# Patient Record
Sex: FEMALE | Race: WHITE | HISPANIC OR LATINO | Employment: FULL TIME | URBAN - METROPOLITAN AREA
[De-identification: names, ages, dates, MRNs, and addresses within clinical notes are randomized per-mention and may not be internally consistent; named-entity substitution may affect disease eponyms.]

---

## 2019-08-28 ENCOUNTER — TRANSFERRED RECORDS (OUTPATIENT)
Dept: HEALTH INFORMATION MANAGEMENT | Facility: CLINIC | Age: 54
End: 2019-08-28
Payer: COMMERCIAL

## 2022-02-21 ENCOUNTER — TRANSCRIBE ORDERS (OUTPATIENT)
Dept: OTHER | Age: 57
End: 2022-02-21
Payer: COMMERCIAL

## 2022-02-21 DIAGNOSIS — G96.01 CSF LEAK FROM NOSE: Primary | ICD-10-CM

## 2022-02-22 ENCOUNTER — PATIENT OUTREACH (OUTPATIENT)
Dept: OTOLARYNGOLOGY | Facility: CLINIC | Age: 57
End: 2022-02-22
Payer: COMMERCIAL

## 2022-02-22 NOTE — PROGRESS NOTES
Writer called and LM regarding scheduling an appointment with Dr. Khoury. Left direct call back number.     Placed hold on schedule for 2/23 at 1000 for patient. She is scheduled for 1500 on 3/2/22.     Lola Llanos RN on 2/22/2022 at 2:33 PM

## 2022-02-22 NOTE — PROGRESS NOTES
Was able to speak with patient. Rearranged her appointment time for 3/2/22 at 1230.     Patient verbalized understanding.     Lola Llanos RN on 2/22/2022 at 2:50 PM

## 2022-02-23 NOTE — TELEPHONE ENCOUNTER
RECORDS RECEIVED FROM: External   REASON FOR VISIT: CSF leak from nose   Date of Appt: 3/2/22   NOTES (FOR ALL VISITS) STATUS DETAILS   OFFICE NOTE from referring provider Care Everywhere Dr Ivana Garrison @ [facility] Neurosurgery:  2/16/22   OFFICE NOTE from other specialist Care Everywhere Dr Sravan Bardales @ [facility] ENT:  9/27/19    Adrianna Douglas NP @ [facility] Neurosurgery:  9/25/19   DISCHARGE REPORT from the ER Care Everywhere [facility]:  9/18/19   OPERATIVE REPORT Care Everywhere [facility]:  10/1/19  placement of lumbar drain   MEDICATION LIST Care Everywhere    IMAGING  (FOR ALL VISITS)     MRI (HEAD, NECK, SPINE) Received [facility]:  MRI Brain 9/18/19    [place], [place]:  MRI Brain 9/27/16   CT (HEAD, NECK, SPINE) Received [facility]:  CT Head 12/3/20  CT Maxillofacial 9/27/19  CT Head 9/25/19    [facility]:  CT Head 8/28/19      Action 2/23/22 Mv 11.17am   Action Taken Images requested by Ana - waiting for images    --2/25/22 6.48am--  Images received and resolved in PACS

## 2022-02-23 NOTE — TELEPHONE ENCOUNTER
FUTURE VISIT INFORMATION      FUTURE VISIT INFORMATION:    Date: 3/2/22    Time: 12:30PM    [place] [facility] [place]FORMATION:    Referring provider:      Referring providers clinic:      Reason for visit/diagnosis      RECORDS REQUESTED FROM:       Clinic name Comments Records Status Imaging Status   [facility] NEUROSURGERY   22 note from Ivana Garrison PA-C  19 note from Adrianna Douglas APRN, CNP   Care everywhere     [facility] EAR, NOSE AND THROAT   19 note from Sravan Bardales MD   Care everywhere     [facility] SURGICAL SERVICES   10/1/19 placement of lumbar drain Care everywhere     [facility] imaging  12/3/2020 CT HEAD   19 CT Maxillofacial   19 CT Head    Care everywhere  req 22 - PACS   [facility]  19 MR brain    Care everywhere  req 22 - PACS   [facility] RADIOLOGY MRI    [facility] [place] [address] [phone]    Fax: [phone] 16 MR brain     *trackin Care everywhere  req 22 - received 22 7:54AM Sent a fax to [facility] for images - Amay   *received a fax from [facility], some images were done at [facility] and [facility], sent a fax for images - Amay   *images from [facility] and [facility] received in PACS, waiting for [facility] to be mailed out - Amay   22 4PM received images from [facility] on a disc, sent to upload - Amay

## 2022-02-25 NOTE — PROGRESS NOTES
[facility]  Department of Neurosurgery  [facility]    March 9, 2022    Reason for visit: CSF rhinorrhea, new patient visit    Dear Ms. Garrison and Dr. Vera,    It was a pleasure to see Hannah Rodriguez in the [facility] today as a new patient.  As you recall, Ms. Rodriguez is a 56-year-old right-handed female who was diagnosed initially with CSF rhinorrhea back in 2019. At that time, she presented with clear fluid drainage from the nares, headaches, and dizziness. Under Dr. Vera's direction, she underwent lumbar drain placement which eventually resolved CSF rhinorrhea. A beta-2 transferrin was positive at that time. She did she did not require endonasal surgery at that time. She subsequently has done well however most recently she has experienced similar symptoms to her first episode, including nasal drainage, headaches and return of her dizziness.  She contacted the neurosurgery team at [facility] and she was referred here for further management. Her last eye exam was 10 months ago (05/11/2021) by Dr. Boo Casillas of [facility]. Three months ago, she developed left leg cellulitis otherwise she has been doing well.     Today, the patient reports that she was hospitalized for 18 days during her previous CSF rhinorrhea episode in 2020. During this hospitalization, she had 2 lumbar drain placements. Hannah reports that her dizziness and headaches this time around are much worse than her first episode of CSF rhinorrhea. She reports that she has has falls with head trauma before and after her first CSF rhinorrhea diagnosis. She endorses history of chronic ear disease. Hannah explains that the amount of CSF rhinorrhea waxes and wanes, and specifically increases with activity and with blood pressure increases.  She is interested in gastric sleeve surgery planned for later this year.    She reports that her mother had history of a brain  tumor, and her children feel that her symptoms are similar to their grandmother's. They are worried that their mother is presenting similar symptoms. Hannah notes that her mother has history of tobacco use.    Hannah reports that she has received both of her pneumococcal vaccinations for potential CSF leak, as per direction of our staff prior to our appointment.     Past medical history: atrial fibrillation, ADHD, hypertension, headaches, left knee pain, obesity, YOHANA, prediabetes, hysterectomy, colonoscopy    Medications: Metoprolol, Metformin, vitamin D, diltiazem, lisinopril, gabapentin, Xarelto, Cymbalta diphenhydramine, bisacodyl, Lasix, B12, potassium, acetaminophen    Allergies: Na Benzoate, Sulfamethoxazole, Trimethoprim, Adhesive Tape    Surgical history: 4 c-sections, hysterectomy, tonsillectomy, and cholecystectomy     Family history: Noncontributory    Social history: She is  and is a lifetime non-smoker. She has 4 children. Works for [employer] as a .    Physical exam: She is fluent speech and very pleasant. There is no nasal drainage when leaning forward. Palate symmetric, tongue midline. Shoulders symmetric. HB I/VI bilaterally. She has clear fluid that drains from the nose as she leans forward over 5 minutes.  Visual fields are full to confrontation. Visual acuity is 20/20 uncorrected bilaterally.  Extraocular movements are intact.  Her face is symmetric with activation.  Her tongue is midline.  She has no pronator drift.  She has full strength in all extremities.    Imaging: We reviewed the results of her MRI from 2016, her CT and MRI scans from 2019, and her CT scan from 2020.  On the CT scan, there may be a small left cribriform plate defect, but this is not well seen on MRI scan. There appears to be intact bone along the planum, tuberculum, and sphenoid sinus.  While the sphenoid is well aerated, I do not see communication laterally along the lateral sphenoid sinus  oliva.    Assessment:  1.  History of CSF rhinorrhea in 2019 resolved with lumbar drainage (confirmed with a positive beta-2 transferrin at that time)  2.  Recurrent nasal drainage    Plan:  1.  We reviewed results of her imaging however we are concerned that she has new nasal drainage.  Given her history, she will require work-up to determine if this is consistent with recurrent CSF rhinorrhea.  2.  We will send a new beta-2 transferrin test from nasal drainage collection. She will work to collect this.  3.  I would like to repeat her MRI including T2 coronal thin sections (noncontrast MRI) and CT of the sinuses  4.  Referral to neuro-ophthalmology to rule out papilledema  5.  She will visit with her ENT skull base colleague Dr. Wander Khoury later this morning and he will perform an endoscopy  6.  She will receive the 2 pneumococcal vaccinations according to CDC guidelines for spontaneous CSF leak.  She will coordinate with her primary care physician to receive these.   7.  Dr. Khoury and/or myself will plan to see her back upon completion of her imaging and new beta-2 transferrin results.  5.  We reviewed warning signs for which she will contact us prior to her next appointment with us.  6.  I encouraged her to contact us should any questions or concerns arise in advance of her next appointment      It is been a pleasure to participate in the care of your patient.  Please please contact us if we may be of any assistance for Ms. Rodriguez.    Sincerely,  Trevor Encinas MD    Scribe Disclosure:  I, Sandie Molina, am serving as a scribe to document services personally performed by Trevor Encinas MD at this visit, based upon the provider's statements to me. All documentation has been reviewed by the aforementioned provider prior to being entered into the official medical record.

## 2022-03-02 ENCOUNTER — TELEPHONE (OUTPATIENT)
Dept: NEUROSURGERY | Facility: CLINIC | Age: 57
End: 2022-03-02

## 2022-03-02 ENCOUNTER — TELEPHONE (OUTPATIENT)
Dept: OTOLARYNGOLOGY | Facility: CLINIC | Age: 57
End: 2022-03-02

## 2022-03-02 ENCOUNTER — PRE VISIT (OUTPATIENT)
Dept: NEUROSURGERY | Facility: CLINIC | Age: 57
End: 2022-03-02

## 2022-03-02 ENCOUNTER — PRE VISIT (OUTPATIENT)
Dept: OTOLARYNGOLOGY | Facility: CLINIC | Age: 57
End: 2022-03-02

## 2022-03-02 DIAGNOSIS — G96.00 CSF LEAK: Primary | ICD-10-CM

## 2022-03-02 NOTE — TELEPHONE ENCOUNTER
[facility] Call Center    Phone Message    May a detailed message be left on voicemail: yes     Reason for Call: Other: Pt is scheduled with Dr. Khoury for today, but she needs to r/s as her pipes at home burst. Writer doesn't see a referral or any Appt notes to determine if pt can be scheduled with another provider or if she was referred to Dr. Khoury, and she would like to come in next Wednesday (3/9) as she has another Appt that day as well. Please call pt to discuss r/s options. Thank you.     Action Taken: Message routed to:  [facility] ENT    Travel Screening: Not Applicable

## 2022-03-02 NOTE — TELEPHONE ENCOUNTER
Spoke with patient ahead of her appointment next Wednesday with Dr. Encinas and Dr. Khoury. Dr. Encinas recommending that patient collect nasal drainage to test for beta-2 transferrin prior to appointments, and get pneumococcal vaccines per CSF leak protocol.    Patient reports she tried to collect fluid in clinic at , but was unable to collect enough. She has a specimen cup at home, and a process to bring this to her local lab (they will send out for processing). She will try to collect fluid again over the next week.    Also reviewed recommendations for pneumococcal vaccines with patient. She received both in September and November of 2019. Nothing further needed.    Adela Reis, RN  RN Care Coordinator, Skull Base Surgery  Direct: [phone]

## 2022-03-08 NOTE — PROGRESS NOTES
[facility]  New Patient Visit      Encounter date:   March 9, 2022    Referring Provider:   Ivana Garrison PA-C  No address on file    Chief Complaint: CSF Leak    History of Present Illness:   Hannah Rodriguez is a 56 year old female on Xarelto for atrial fibrillation who presents for consultation regarding CSF leak. The patient was seen by Ivana Garrison on 02/16/2022 for nasal drainage, headache, and dizziness. She was referred here for concern for CSF leak; she was hospitalized for 18 days in regards to CSF leak in 2019 with 2 lumbar drain placements. She did visit with my colleague Dr. Encinas earlier today.      She tells me that after her second drain placement in 2019 she had improvement in her headaches, dizziness, and LEFT-sided rhinorrhea; however these episodes started to resume this last fall. Over the last year she has had mild draining of CSF from the RIGHT nares that transiently worsens with activity and bending over. It is also worse at night and will feel like a post nasal drip. There has been no left sided leakage. In between these episodes she does have baseline rhinorrhea, which she is able to discern from her CSF leak.  She describes CSF rhinorrhea classically as clear watery nasal drainage that has a distinct salty, metallic taste.    Referral has been placed for neuro-ophthalmology.    She will be undergoing workup for gastric sleeve and hopes to get this later this year.     Sino-Nasal Outcome Test (SNOT - 22)  DNT    [place] Oper[facility] History:  No sinonasal surgeries     Review of systems: A 14-point review of systems has been conducted and was negative for any notable symptoms, except as dictated in the history of present illness.     Past medical history: atrial fibrillation, ADHD, hypertension, headaches, left knee pain, obesity, YOHANA, prediabetes, hysterectomy, colonoscopy     Medications: Metoprolol, Metformin, vitamin D, diltiazem, lisinopril, gabapentin, Xarelto,  "Cymbalta diphenhydramine, bisacodyl, Lasix, B12, potassium, acetaminophen     Allergies: Na Benzoate, Sulfamethoxazole, Trimethoprim, Adhesive Tape     Surgical history: 4 c-sections, hysterectomy, tonsillectomy, and cholecystectomy        Social History:   Social History     Socioeconomic History     Marital status:      Spouse name: Not on file     Number of children: Not on file     Years of education: Not on file     Highest education level: Not on file   Occupational History     Not on file   Tobacco Use     Smoking status: Never Smoker     Smokeless tobacco: Never Used   Substance and Sexual Activity     Alcohol use: Not Currently     Drug use: Never     Sexual activity: Not on file   Other Topics Concern     Not on file   Social History Narrative     Not on file     Social Determinants of Health     Financial Resource Strain: Not on file   Food Insecurity: Not on file   Transportation Needs: Not on file   Physical Activity: Not on file   Stress: Not on file   Social Connections: Not on file   Intimate Partner Violence: Not on file   Housing Stability: Not on file        Physical Exam:  Vital signs: BP (!) 155/66 (BP Location: Left arm, Patient Position: Sitting)   Pulse 63   Ht 1.778 m (5' 10\")   Wt 131.5 kg (289 lb 14.4 oz)   SpO2 95%   BMI 41.60 kg/m     General Appearance: No acute distress, appropriate demeanor, conversant  Eyes: moist conjunctivae; EOMI; pupils symmetric; visual acuity grossly intact; no proptosis  Head: normocephalic; overall symmetric appearance without deformity  Face: overall symmetric without deformity; HB I/VI  Ears: Normal appearance of external ear; external meatus normal in appearance; TMs intact without perforation bilaterally;   Nose: No external deformity; inferior turbinates without significant hypertrophy  Oral Cavity/oropharynx: Normal appearance of mucosa; tongue midline; no mass or lesions; tonsils surgically absent; oropharynx without obvious mucosal " abnormality  Neck: no palpable lymphadenopathy; thyroid without palpable nodules  Lungs: symmetric chest rise; no wheezing  CV: Good distal perfusion; normal hear rate  Extremities: No deformity  Neurologic Exam: Cranial nerves II-XII are grossly intact; no focal deficit      Procedure Note  Procedure performed: Rigid nasal endoscopy  Indication: To evaluate for sinonasal pathology not visualized on routine anterior rhinoscopy  Anesthesia: 4% topical lidocaine with 0.05% oxymetazoline  Description of procedure: A 30 degree, 3 mm rigid endoscope was inserted into bilateral nasal cavities and the nasal valves, nasal cavity, middle meatus, sphenoethmoid recess, and nasopharynx were thoroughly evaluated for evidence of obstruction, edema, purulence, polyps and/or mass/lesion.     [place] Endoscopic Scoring System  Endoscopic observation Right Left   Polyps in middle meatus (0 = absent, 1 = restricted to middle meatus, 2 = Beyond middle meatus) 0 0   Discharge (0 = absent, 1 = thin and clear, 2 = thick, purulent) 0 0   Edema (0 = absent, 1 = mild-moderate, 2 = moderate-severe) 0 0   Crusting (0 = absent, 1 = mild-moderate, 2 = moderate-severe) 0 0   Scarring (0= absent, 1 = mild-moderate, 2 = moderate-severe) 0 0   Total 0 0     Findings  RT: no active pooling of clear rhinorrhea; no pooling of secretions in olfactory cleft  LT: no active pooling of clear rhinorrhea; no pooling of secretions in olfactory cleft    The patient tolerated the procedure well without complication.     Laboratory Review:  n/a    Imaging Review:  Reviewed outside MRI B and CT    CT indicates the presence of a left cribriform plate defect    This is not is clear on MRI brain      Pathology Review:  n/a    Assessment/Medical Decision Making:  Spontaneous CSF leak, 2 lumbar drains  Hypertension   Morbid obesity   Atrial fibrillation on Xarelto   Recurrent epistaxis       Plan:  Endoscopy here shows no active CSF pooling or drainage today. We  will have her try to collect some fluid today and have her undergo CT. I agree with Dr. Colorado's plan for her to follow with ophthalmology to rule out papilledema. She should continue follow-up with her weight loss team. I did discuss CSF repair surgery which may be indicated in the future. We will have her follow-up pending results of CT and potentially nasal secretion drainage.    We discussed that surgery may require coverage of bilateral cribriform plates and that this may affect her sense of smell.  We also did discuss the importance of weight loss for long-term management of intracranial hypertension.    Handouts provided regarding CSF leak and repair today.      Yoni Khoury MD    [facility]  Rhinology  Endoscopic Skull Base Surgery  [facility]  Department of Otolaryngology - Head & Neck Surgery    Scribe Disclosure:  I, Bryn Price, am serving as a scribe to document services personally performed by Yoni Khoury MD at this visit, based upon the provider's statements to me. All documentation has been reviewed by the aforementioned provider prior to being entered into the official medical record.     The above documentation was completed with the aid of voice recognition dictation software, and as a result, unexpected dictation errors may occur. Please don't hesitate to contact me for any clarification needed regarding this note.

## 2022-03-09 ENCOUNTER — TELEPHONE (OUTPATIENT)
Dept: OPHTHALMOLOGY | Facility: CLINIC | Age: 57
End: 2022-03-09

## 2022-03-09 ENCOUNTER — ANCILLARY PROCEDURE (OUTPATIENT)
Dept: CT IMAGING | Facility: CLINIC | Age: 57
End: 2022-03-09
Attending: OTOLARYNGOLOGY
Payer: COMMERCIAL

## 2022-03-09 ENCOUNTER — OFFICE VISIT (OUTPATIENT)
Dept: NEUROSURGERY | Facility: CLINIC | Age: 57
End: 2022-03-09
Payer: COMMERCIAL

## 2022-03-09 ENCOUNTER — OFFICE VISIT (OUTPATIENT)
Dept: OTOLARYNGOLOGY | Facility: CLINIC | Age: 57
End: 2022-03-09
Payer: COMMERCIAL

## 2022-03-09 VITALS
OXYGEN SATURATION: 95 % | HEART RATE: 63 BPM | BODY MASS INDEX: 41.5 KG/M2 | HEIGHT: 70 IN | DIASTOLIC BLOOD PRESSURE: 66 MMHG | WEIGHT: 289.9 LBS | SYSTOLIC BLOOD PRESSURE: 155 MMHG

## 2022-03-09 VITALS — SYSTOLIC BLOOD PRESSURE: 166 MMHG | HEART RATE: 82 BPM | OXYGEN SATURATION: 94 % | DIASTOLIC BLOOD PRESSURE: 94 MMHG

## 2022-03-09 DIAGNOSIS — G96.01 CSF RHINORRHEA: Primary | ICD-10-CM

## 2022-03-09 DIAGNOSIS — G93.2 INTRACRANIAL HYPERTENSION: ICD-10-CM

## 2022-03-09 DIAGNOSIS — E66.01 MORBID OBESITY (H): ICD-10-CM

## 2022-03-09 DIAGNOSIS — J32.9 SINUSITIS: ICD-10-CM

## 2022-03-09 DIAGNOSIS — G96.00 CSF LEAK: Primary | ICD-10-CM

## 2022-03-09 PROCEDURE — 70486 CT MAXILLOFACIAL W/O DYE: CPT | Performed by: RADIOLOGY

## 2022-03-09 PROCEDURE — 99204 OFFICE O/P NEW MOD 45 MIN: CPT | Performed by: NEUROLOGICAL SURGERY

## 2022-03-09 PROCEDURE — 31231 NASAL ENDOSCOPY DX: CPT | Performed by: OTOLARYNGOLOGY

## 2022-03-09 PROCEDURE — 99204 OFFICE O/P NEW MOD 45 MIN: CPT | Mod: 25 | Performed by: OTOLARYNGOLOGY

## 2022-03-09 RX ORDER — METFORMIN HCL 500 MG
500 TABLET, EXTENDED RELEASE 24 HR ORAL 2 TIMES DAILY
COMMUNITY
Start: 2022-01-04

## 2022-03-09 RX ORDER — METOPROLOL TARTRATE 50 MG
50 TABLET ORAL 2 TIMES DAILY
COMMUNITY

## 2022-03-09 RX ORDER — LISINOPRIL 5 MG/1
5 TABLET ORAL DAILY
COMMUNITY
Start: 2021-07-01

## 2022-03-09 RX ORDER — DIPHENHYDRAMINE HCL 25 MG
25 CAPSULE ORAL EVERY 6 HOURS PRN
COMMUNITY
Start: 2021-05-06

## 2022-03-09 RX ORDER — FERROUS SULFATE 325(65) MG
325 TABLET ORAL
COMMUNITY
Start: 2022-02-23

## 2022-03-09 RX ORDER — BISACODYL 5 MG/1
10 TABLET, DELAYED RELEASE ORAL DAILY PRN
COMMUNITY
Start: 2021-04-13

## 2022-03-09 RX ORDER — DILTIAZEM HYDROCHLORIDE 180 MG/1
180 CAPSULE, EXTENDED RELEASE ORAL DAILY
COMMUNITY
Start: 2021-10-07

## 2022-03-09 RX ORDER — FUROSEMIDE 40 MG
40 TABLET ORAL DAILY
COMMUNITY
Start: 2022-03-07

## 2022-03-09 RX ORDER — POTASSIUM CHLORIDE 1500 MG/1
20 TABLET, EXTENDED RELEASE ORAL DAILY
COMMUNITY
Start: 2022-03-07

## 2022-03-09 RX ORDER — DULOXETIN HYDROCHLORIDE 20 MG/1
1 CAPSULE, DELAYED RELEASE ORAL DAILY
COMMUNITY
Start: 2021-04-15

## 2022-03-09 RX ORDER — ACETAMINOPHEN 500 MG
1000 TABLET ORAL EVERY 8 HOURS PRN
COMMUNITY
Start: 2020-12-09

## 2022-03-09 RX ORDER — GABAPENTIN 600 MG/1
2 TABLET ORAL SEE ADMIN INSTRUCTIONS
COMMUNITY
Start: 2021-06-09

## 2022-03-09 ASSESSMENT — PATIENT HEALTH QUESTIONNAIRE - PHQ9: SUM OF ALL RESPONSES TO PHQ QUESTIONS 1-9: 16

## 2022-03-09 ASSESSMENT — PAIN SCALES - GENERAL: PAINLEVEL: SEVERE PAIN (6)

## 2022-03-09 NOTE — PATIENT INSTRUCTIONS
"1. You were seen in the clinic today by Dr. Khoury. If you have any questions or concerns after your appointment, please call the clinic at [phone]. Press \"1\" for scheduling, press \"3\" for nurse advice.    2.   The following has been recommended for you based upon your appointment today:   - CT scan of facial bones.   - Collect CSF fluid.    3.   Plan to return the clinic pending CT results.       Sandie Zurita LPN  [facility]  Department of Otolaryngology  [phone]    "

## 2022-03-09 NOTE — TELEPHONE ENCOUNTER
[facility] Call Center    Phone Message    May a detailed message be left on voicemail: no     Reason for Call: Appointment Intake    Referring Provider Name: CHRISTINE YOU  Diagnosis and/or Symptoms: CSF leak      Dr. Escobar or Dr. Edge - Neuro-ophthalmology please    Sending to clinic for review    Action Taken: Other: EYE    Travel Screening: Not Applicable

## 2022-03-09 NOTE — Clinical Note
3/9/2022       RE: Hannah Rodriguez  [address] [facility] [address]     Dear Colleague,    Thank you for referring your patient, Hannah Rodriguez, to the [facility] NEUROSURGERY CLINIC [place] at [facility]. Please see a copy of my visit note below.    [facility]  Department of Neurosurgery  Center for Skull Base and Pituitary Surgery    March 9, 2022    Reason for visit: CSF rhinorrhea, new patient visit    Dear Ms. Garrison and Dr. Vera,    It was a pleasure to see Hannah Rodriguez in the Center for Skull Base and Pituitary Surgery today as a new patient.  As you recall, Ms. Rodriguez is a 56-year-old right-handed female who was diagnosed initially with CSF rhinorrhea back in 2019. At that time, she presented with clear fluid drainage from the nares, headaches, and dizziness. Under Dr. Vera's direction, she underwent lumbar drain placement which eventually resolved CSF rhinorrhea. A beta-2 transferrin was positive at that time. She did she did not require endonasal surgery at that time. She subsequently has done well however most recently she has experienced similar symptoms to her first episode, including nasal drainage, headaches and return of her dizziness.  She contacted the neurosurgery team at [facility] and she was referred here for further management. Her last eye exam was 10 months ago (05/11/2021) by Dr. Boo Casillas of [facility]. Three months ago, she developed left leg cellulitis otherwise she has been doing well.     Today, the patient reports that she was hospitalized for 18 days during her previous CSF rhinorrhea episode in 2020. During this hospitalization, she had 2 lumbar drain placements. Hannah reports that her dizziness and headaches this time around are much worse than her first episode of CSF rhinorrhea. She reports that she has has falls with head trauma before and after her first CSF rhinorrhea diagnosis. She  endorses history of chronic ear disease. Hannah explains that the amount of CSF rhinorrhea waxes and wanes, and specifically increases with activity and with blood pressure increases.  She is interested in gastric sleeve surgery planned for later this year.    She reports that her mother had history of a brain tumor, and her children feel that her symptoms are similar to their grandmother's. They are worried that their mother is presenting similar symptoms. Hannah notes that her mother has history of tobacco use.    Hannah reports that she has received both of her pneumococcal vaccinations for potential CSF leak, as per direction of our staff prior to our appointment.     Past medical history: atrial fibrillation, ADHD, hypertension, headaches, left knee pain, obesity, YOHANA, prediabetes, hysterectomy, colonoscopy    Medications: Metoprolol, Metformin, vitamin D, diltiazem, lisinopril, gabapentin, Xarelto, Cymbalta diphenhydramine, bisacodyl, Lasix, B12, potassium, acetaminophen    Allergies: Na Benzoate, Sulfamethoxazole, Trimethoprim, Adhesive Tape    Surgical history: 4 c-sections, hysterectomy, tonsillectomy, and cholecystectomy     Family history: Noncontributory    Social history: She is  and is a lifetime non-smoker. She has 4 children. Works for [employer] as a .    Physical exam: She is fluent speech and very pleasant. There is no nasal drainage when leaning forward. Palate symmetric, tongue midline. Shoulders symmetric. HB I/VI bilaterally. She has clear fluid that drains from the nose as she leans forward over 5 minutes.  Visual fields are full to confrontation. Visual acuity is 20/20 uncorrected bilaterally.  Extraocular movements are intact.  Her face is symmetric with activation.  Her tongue is midline.  She has no pronator drift.  She has full strength in all extremities.    Imaging: We reviewed the results of her MRI from 2016, her CT and MRI scans from 2019, and her  CT scan from 2020.  On the CT scan, there may be a small left cribriform plate defect, but this is not well seen on MRI scan. There appears to be intact bone along the planum, tuberculum, and sphenoid sinus.  While the sphenoid is well aerated, I do not see communication laterally along the lateral sphenoid sinus wall.    Assessment:  1.  History of CSF rhinorrhea in 2019 resolved with lumbar drainage (confirmed with a positive beta-2 transferrin at that time)  2.  Recurrent nasal drainage    Plan:  1.  We reviewed results of her imaging however we are concerned that she has new nasal drainage.  Given her history, she will require work-up to determine if this is consistent with recurrent CSF rhinorrhea.  2.  We will send a new beta-2 transferrin test from nasal drainage collection. She will work to collect this.  3.  I would like to repeat her MRI including T2 coronal thin sections (noncontrast MRI) and CT of the sinuses  4.  Referral to neuro-ophthalmology to rule out papilledema  5.  She will visit with her ENT skull base colleague Dr. Wander Khoury later this morning and he will perform an endoscopy  6.  She will receive the 2 pneumococcal vaccinations according to CDC guidelines for spontaneous CSF leak.  She will coordinate with her primary care physician to receive these.   7.  Dr. Khoury and/or myself will plan to see her back upon completion of her imaging and new beta-2 transferrin results.  5.  We reviewed warning signs for which she will contact us prior to her next appointment with us.  6.  I encouraged her to contact us should any questions or concerns arise in advance of her next appointment      It is been a pleasure to participate in the care of your patient.  Please please contact us if we may be of any assistance for Ms. Rodriguez.    Sincerely,  Trevor Encinas MD    Scribe Disclosure:  I, Sandie Molina, am serving as a scribe to document services personally performed by Trevor Encinas MD at  this visit, based upon the provider's statements to me. All documentation has been reviewed by the aforementioned provider prior to being entered into the official medical record.         Again, thank you for allowing me to participate in the care of your patient.      Sincerely,    Trevor Encinas MD

## 2022-03-09 NOTE — LETTER
3/9/2022     RE: Hannah Rodriguez  [address] [facility] [address]     Dear Colleague,    Thank you for referring your patient, Hannah Rodriguez, to the [facility] EAR NOSE AND THROAT CLINIC [place] at [facility]. Please see a copy of my visit note below.      [facility]  New Patient Visit      Encounter date:   March 9, 2022    Referring Provider:   Ivana Garrison PA-C  No address on file    Chief Complaint: CSF Leak    History of Present Illness:   Hannah Rodriguez is a 56 year old female on Xarelto for atrial fibrillation who presents for consultation regarding CSF leak. The patient was seen by Ivana Garrison on 02/16/2022 for nasal drainage, headache, and dizziness. She was referred here for concern for CSF leak; she was hospitalized for 18 days in regards to CSF leak in 2019 with 2 lumbar drain placements. She did visit with my colleague Dr. Encinas earlier today.      She tells me that after her second drain placement in 2019 she had improvement in her headaches, dizziness, and LEFT-sided rhinorrhea; however these episodes started to resume this last fall. Over the last year she has had mild draining of CSF from the RIGHT nares that transiently worsens with activity and bending over. It is also worse at night and will feel like a post nasal drip. There has been no left sided leakage. In between these episodes she does have baseline rhinorrhea, which she is able to discern from her CSF leak.  She describes CSF rhinorrhea classically as clear watery nasal drainage that has a distinct salty, metallic taste.    Referral has been placed for neuro-ophthalmology.    She will be undergoing workup for gastric sleeve and hopes to get this later this year.     Sino-Nasal Outcome Test (SNOT - 22)  DNT    [place] [facility] History:  No sinonasal surgeries     Review of systems: A 14-point review of systems has been conducted and was negative for  "any notable symptoms, except as dictated in the history of present illness.     Past medical history: atrial fibrillation, ADHD, hypertension, headaches, left knee pain, obesity, YOHANA, prediabetes, hysterectomy, colonoscopy     Medications: Metoprolol, Metformin, vitamin D, diltiazem, lisinopril, gabapentin, Xarelto, Cymbalta diphenhydramine, bisacodyl, Lasix, B12, potassium, acetaminophen     Allergies: Na Benzoate, Sulfamethoxazole, Trimethoprim, Adhesive Tape     Surgical history: 4 c-sections, hysterectomy, tonsillectomy, and cholecystectomy        Social History:   Social History     Socioeconomic History     Marital status:      Spouse name: Not on file     Number of children: Not on file     Years of education: Not on file     Highest education level: Not on file   Occupational History     Not on file   Tobacco Use     Smoking status: Never Smoker     Smokeless tobacco: Never Used   Substance and Sexual Activity     Alcohol use: Not Currently     Drug use: Never     Sexual activity: Not on file   Other Topics Concern     Not on file   Social History Narrative     Not on file     Social Determinants of Health     Financial Resource Strain: Not on file   Food Insecurity: Not on file   Transportation Needs: Not on file   Physical Activity: Not on file   Stress: Not on file   Social Connections: Not on file   Intimate Partner Violence: Not on file   Housing Stability: Not on file        Physical Exam:  Vital signs: BP (!) 155/66 (BP Location: Left arm, Patient Position: Sitting)   Pulse 63   Ht 1.778 m (5' 10\")   Wt 131.5 kg (289 lb 14.4 oz)   SpO2 95%   BMI 41.60 kg/m     General Appearance: No acute distress, appropriate demeanor, conversant  Eyes: moist conjunctivae; EOMI; pupils symmetric; visual acuity grossly intact; no proptosis  Head: normocephalic; overall symmetric appearance without deformity  Face: overall symmetric without deformity; HB I/VI  Ears: Normal appearance of external ear; " external meatus normal in appearance; TMs intact without perforation bilaterally;   Nose: No external deformity; inferior turbinates without significant hypertrophy  Oral Cavity/oropharynx: Normal appearance of mucosa; tongue midline; no mass or lesions; tonsils surgically absent; oropharynx without obvious mucosal abnormality  Neck: no palpable lymphadenopathy; thyroid without palpable nodules  Lungs: symmetric chest rise; no wheezing  CV: Good distal perfusion; normal hear rate  Extremities: No deformity  Neurologic Exam: Cranial nerves II-XII are grossly intact; no focal deficit      Procedure Note  Procedure performed: Rigid nasal endoscopy  Indication: To evaluate for sinonasal pathology not visualized on routine anterior rhinoscopy  Anesthesia: 4% topical lidocaine with 0.05% oxymetazoline  Description of procedure: A 30 degree, 3 mm rigid endoscope was inserted into bilateral nasal cavities and the nasal valves, nasal cavity, middle meatus, sphenoethmoid recess, and nasopharynx were thoroughly evaluated for evidence of obstruction, edema, purulence, polyps and/or mass/lesion.     Pablo-Jun Endoscopic Scoring System  Endoscopic observation Right Left   Polyps in middle meatus (0 = absent, 1 = restricted to middle meatus, 2 = Beyond middle meatus) 0 0   Discharge (0 = absent, 1 = thin and clear, 2 = thick, purulent) 0 0   Edema (0 = absent, 1 = mild-moderate, 2 = moderate-severe) 0 0   Crusting (0 = absent, 1 = mild-moderate, 2 = moderate-severe) 0 0   Scarring (0= absent, 1 = mild-moderate, 2 = moderate-severe) 0 0   Total 0 0     Findings  RT: no active pooling of clear rhinorrhea; no pooling of secretions in olfactory cleft  LT: no active pooling of clear rhinorrhea; no pooling of secretions in olfactory cleft    The patient tolerated the procedure well without complication.     Laboratory Review:  n/a    Imaging Review:  Reviewed outside MRI B and CT    CT indicates the presence of a left cribriform  plate defect    This is not is clear on MRI brain      Pathology Review:  n/a    Assessment/Medical Decision Making:  Spontaneous CSF leak, 2 lumbar drains  Hypertension   Morbid obesity   Atrial fibrillation on Xarelto   Recurrent epistaxis       Plan:  Endoscopy here shows no active CSF pooling or drainage today. We will have her try to collect some fluid today and have her undergo CT. I agree with Dr. Colorado's plan for her to follow with ophthalmology to rule out papilledema. She should continue follow-up with her weight loss team. I did discuss CSF repair surgery which may be indicated in the future. We will have her follow-up pending results of CT and potentially nasal secretion drainage.    We discussed that surgery may require coverage of bilateral cribriform plates and that this may affect her sense of smell.  We also did discuss the importance of weight loss for long-term management of intracranial hypertension.    Handouts provided regarding CSF leak and repair today.      Yoni Khoury MD    [facility]  Rhinology  Endoscopic Skull Base Surgery  [facility]  Department of Otolaryngology - Head & Neck Surgery    Scribe Disclosure:  I, Bryn Price, am serving as a scribe to document services personally performed by Yoni Khoury MD at this visit, based upon the provider's statements to me. All documentation has been reviewed by the aforementioned provider prior to being entered into the official medical record.     The above documentation was completed with the aid of voice recognition dictation software, and as a result, unexpected dictation errors may occur. Please don't hesitate to contact me for any clarification needed regarding this note.     Again, thank you for allowing me to participate in the care of your patient.      Sincerely,  Yoni Khoury MD

## 2022-03-09 NOTE — TELEPHONE ENCOUNTER
She was seen today by Dr. Encinas @ neuro Clinic - notes are in the chart     Next available ?      Thank You,     Constanza

## 2022-03-09 NOTE — NURSING NOTE
"Chief Complaint   Patient presents with     Consult     CSF leak, headache, facial pain    Blood pressure (!) 155/66, pulse 63, height 1.778 m (5' 10\"), weight 131.5 kg (289 lb 14.4 oz), SpO2 95 %. Sandie Zurita LPN    "

## 2022-03-09 NOTE — LETTER
Date:March 9, 2022      Provider requested that no letter be sent. Do not send.       [facility]

## 2022-03-09 NOTE — LETTER
CENTER FOR SKULL BASE AND PITUITARY SURGERY  [facility] NEUROSURGERY CLINIC [address]  3RD FLOOR  [facility] [address]  Phone: [phone]  Fax: [phone]          3/9/2022    RE:   Hannah Rodriguez  [address] [facility] [address]      Dear Colleague,    Thank you for referring your patient, Hannah Rodriguez, to the Center for Skull Base and Pituitary Surgery. Please see a copy of my visit note below.      [facility]  Department of Neurosurgery  Center for Skull Base and Pituitary Surgery    March 9, 2022    Reason for visit: CSF rhinorrhea, new patient visit    Dear Ms. Garrison and Dr. Vera,    It was a pleasure to see Hannah Rodriguez in the Center for Skull Base and Pituitary Surgery today as a new patient.  As you recall, Ms. Rodriguez is a 56-year-old right-handed female who was diagnosed initially with CSF rhinorrhea back in 2019. At that time, she presented with clear fluid drainage from the nares, headaches, and dizziness. Under Dr. Vera's direction, she underwent lumbar drain placement which eventually resolved CSF rhinorrhea. A beta-2 transferrin was positive at that time. She did she did not require endonasal surgery at that time. She subsequently has done well however most recently she has experienced similar symptoms to her first episode, including nasal drainage, headaches and return of her dizziness.  She contacted the neurosurgery team at [facility] and she was referred here for further management. Her last eye exam was 10 months ago (05/11/2021) by Dr. Boo Casillas of [facility]. Three months ago, she developed left leg cellulitis otherwise she has been doing well.     Today, the patient reports that she was hospitalized for 18 days during her previous CSF rhinorrhea episode in 2020. During this hospitalization, she had 2 lumbar drain placements. Hannah reports that her dizziness and headaches this time around are much worse than her  first episode of CSF rhinorrhea. She reports that she has has falls with head trauma before and after her first CSF rhinorrhea diagnosis. She endorses history of chronic ear disease. Hannah explains that the amount of CSF rhinorrhea waxes and wanes, and specifically increases with activity and with blood pressure increases.  She is interested in gastric sleeve surgery planned for later this year.    She reports that her mother had history of a brain tumor, and her children feel that her symptoms are similar to their grandmother's. They are worried that their mother is presenting similar symptoms. Hannah notes that her mother has history of tobacco use.    Hannah reports that she has received both of her pneumococcal vaccinations for potential CSF leak, as per direction of our staff prior to our appointment.     Past medical history: atrial fibrillation, ADHD, hypertension, headaches, left knee pain, obesity, YOHANA, prediabetes, hysterectomy, colonoscopy    Medications: Metoprolol, Metformin, vitamin D, diltiazem, lisinopril, gabapentin, Xarelto, Cymbalta diphenhydramine, bisacodyl, Lasix, B12, potassium, acetaminophen    Allergies: Na Benzoate, Sulfamethoxazole, Trimethoprim, Adhesive Tape    Surgical history: 4 c-sections, hysterectomy, tonsillectomy, and cholecystectomy     Family history: Noncontributory    Social history: She is  and is a lifetime non-smoker. She has 4 children. Works for [employer] as a .    Physical exam: She is fluent speech and very pleasant. There is no nasal drainage when leaning forward. Palate symmetric, tongue midline. Shoulders symmetric. HB I/VI bilaterally. She has clear fluid that drains from the nose as she leans forward over 5 minutes.  Visual fields are full to confrontation. Visual acuity is 20/20 uncorrected bilaterally.  Extraocular movements are intact.  Her face is symmetric with activation.  Her tongue is midline.  She has no pronator  drift.  She has full strength in all extremities.    Imaging: We reviewed the results of her MRI from 2016, her CT and MRI scans from 2019, and her CT scan from 2020.  On the CT scan, there may be a small left cribriform plate defect, but this is not well seen on MRI scan. There appears to be intact bone along the planum, tuberculum, and sphenoid sinus.  While the sphenoid is well aerated, I do not see communication laterally along the lateral sphenoid sinus wall.    Assessment:  1.  History of CSF rhinorrhea in 2019 resolved with lumbar drainage (confirmed with a positive beta-2 transferrin at that time)  2.  Recurrent nasal drainage    Plan:  1.  We reviewed results of her imaging however we are concerned that she has new nasal drainage.  Given her history, she will require work-up to determine if this is consistent with recurrent CSF rhinorrhea.  2.  We will send a new beta-2 transferrin test from nasal drainage collection. She will work to collect this.  3.  I would like to repeat her MRI including T2 coronal thin sections (noncontrast MRI) and CT of the sinuses  4.  Referral to neuro-ophthalmology to rule out papilledema  5.  She will visit with her ENT skull base colleague Dr. Wander Khoury later this morning and he will perform an endoscopy  6.  She will receive the 2 pneumococcal vaccinations according to CDC guidelines for spontaneous CSF leak.  She will coordinate with her primary care physician to receive these.   7.  Dr. Khoury and/or myself will plan to see her back upon completion of her imaging and new beta-2 transferrin results.  5.  We reviewed warning signs for which she will contact us prior to her next appointment with us.  6.  I encouraged her to contact us should any questions or concerns arise in advance of her next appointment      It is been a pleasure to participate in the care of your patient.  Please please contact us if we may be of any assistance for Ms. Rodriguez.    Sincerely,  Trevor  Ce PANIAGUA    Scribe Disclosure:  I, Sandie Tracy, am serving as a scribe to document services personally performed by Trevor Encinas MD at this visit, based upon the provider's statements to me. All documentation has been reviewed by the aforementioned provider prior to being entered into the official medical record.           Again, thank you for allowing me to participate in the care of your patient.      Sincerely,    Trevor Encinas MD

## 2022-03-09 NOTE — PATIENT INSTRUCTIONS
Thank you for choosing [facility]. You were seen in the Skull Base Clinic today with Dr. Encinas.    Next steps:  1. Please continue to try and collect nasal drainage so we can test this for beta-2 transferrin.  2. We've placed an order for an MRI (noncontrast), and our scheduling team can help you to schedule this. Otherwise, if you prefer to schedule this, you can call our imaging department at [phone].  3. Referral to neuro ophthalmology  - their team will contact you to schedule this appointment.    Please don't hesitate to reach out via [employer] or telephone if you have any questions after your visit.      Contact Numbers:  Neurosurgery Clinic: [phone]  ENT Clinic: [phone]     Adela Reis MA, RN, PHN, [facility]  RN Care Coordinator, Skull Base Surgery  Direct: [phone]    Nakita Allan RN  RN Care Coordinator, Neurosurgery  Direct: [phone]     We do our best to check voicemail frequently throughout the day. For urgent matters, please use the general clinic phone numbers listed above. Your call will be routed appropriately.

## 2022-03-11 ENCOUNTER — TELEPHONE (OUTPATIENT)
Dept: OPHTHALMOLOGY | Facility: CLINIC | Age: 57
End: 2022-03-11
Payer: COMMERCIAL

## 2022-03-11 ENCOUNTER — TELEPHONE (OUTPATIENT)
Dept: NEUROSURGERY | Facility: CLINIC | Age: 57
End: 2022-03-11
Payer: COMMERCIAL

## 2022-03-11 DIAGNOSIS — G96.00 CSF LEAK: Primary | ICD-10-CM

## 2022-03-11 NOTE — TELEPHONE ENCOUNTER
Called and LVM for Hannah in regards to a referral.     Per Neuro- Hannah can see Dr.. Herbert for next available.     Constanza Mauricio Communication Facilitator on 3/11/2022 at 8:32 AM

## 2022-03-11 NOTE — TELEPHONE ENCOUNTER
Patient reported she was not able to collect any fluid from her nose while in clinic on Wednesday. She is going to keep trying to collect fluid over the weekend. Her primary care provider placed an order for beta 2 testing at [facility] in [place], and she would prefer to bring the sample there (they'll send to [place] for testing). Informed her this would be okay to do. She'll keep us informed.    Adela Reis RN  RN Care Coordinator, Skull Base Surgery  Direct: [phone]

## 2022-03-11 NOTE — TELEPHONE ENCOUNTER
Called and spoke to Hannah     Made her an appointment with Dr. Herbert for 3/28 @ 9:30 am     Mailed out a new pt packet with time, date and map     Constanza Mauricio Communication Facilitator on 3/11/2022 at 10:56 AM

## 2022-04-03 ENCOUNTER — HEALTH MAINTENANCE LETTER (OUTPATIENT)
Age: 57
End: 2022-04-03

## 2022-04-08 ENCOUNTER — TELEPHONE (OUTPATIENT)
Dept: OPHTHALMOLOGY | Facility: CLINIC | Age: 57
End: 2022-04-08
Payer: COMMERCIAL

## 2022-04-08 NOTE — TELEPHONE ENCOUNTER
[facility] Call Center    Phone Message    May a detailed message be left on voicemail: yes     Reason for Call: Other:   Pt would like to speak to someone about her upcoming appt and what it's for. Pt has a couple of questions to ask and would like a call back from clinic to discuss.     Action Taken: Other:  eye     Travel Screening: Not Applicable

## 2022-05-19 NOTE — TELEPHONE ENCOUNTER
Called patient to check in. Last seen in clinic w/ Dr. Encinas and Dr. Khoury on 3/9 to evaluate for CSF leak. She reports her nasal drainage stopped about about 3-4 weeks ago. She was unable to collect fluid for beta 2 testing. Still reports dizziness when she turns quickly.     She states she was evaluated a couple weeks ago for stroke symptoms, was taken to the ED via ambulance, and she doesn't remember much about this. Notes indicate she presented with ataxia and mild disorientation. CT head was performed and was negative for acute findings. Pt's symptoms improved while in ED. Labs showed mildly elevated CRP and WBC, she was started on antibiotics for LLE cellulitis.    Patient states she is having some trouble coming up with words today, but then states this has been chronic since the fall of 2021. Encouraged her to continue working closely with her PCP for chronic nature of these symptoms. She has seen her PCP w/ EKG following ED visit, and also has cardiology follow up planned within the next week.     Patient will see neuro-ophthalmology tomorrow. Patient has not completed MRI yet. She was unsure how to complete this. [facility] Writer faxed order for patient. She knows to call [facility] tomorrow or Monday to make appt for MRI.    Encouraged pt to call with questions.

## 2022-05-20 ENCOUNTER — OFFICE VISIT (OUTPATIENT)
Dept: OPHTHALMOLOGY | Facility: CLINIC | Age: 57
End: 2022-05-20
Attending: INTERNAL MEDICINE
Payer: COMMERCIAL

## 2022-05-20 DIAGNOSIS — G96.00 CSF LEAK: Primary | ICD-10-CM

## 2022-05-20 DIAGNOSIS — G96.00 CSF LEAK: ICD-10-CM

## 2022-05-20 PROCEDURE — 92083 EXTENDED VISUAL FIELD XM: CPT | Performed by: INTERNAL MEDICINE

## 2022-05-20 PROCEDURE — 99204 OFFICE O/P NEW MOD 45 MIN: CPT | Performed by: INTERNAL MEDICINE

## 2022-05-20 PROCEDURE — G0463 HOSPITAL OUTPT CLINIC VISIT: HCPCS | Mod: 25

## 2022-05-20 PROCEDURE — 92133 CPTRZD OPH DX IMG PST SGM ON: CPT | Performed by: INTERNAL MEDICINE

## 2022-05-20 ASSESSMENT — CUP TO DISC RATIO
OD_RATIO: 0.2
OS_RATIO: 0.2

## 2022-05-20 ASSESSMENT — VISUAL ACUITY
OS_PH_SC+: -2
OD_PH_SC+: +2
OS_SC: J16
METHOD: SNELLEN - LINEAR
OD_CC: J13
OS_SC: 20/60
OS_SC+: -2
OS_CC: J5
OD_PH_SC: 20/50
OD_SC: 20/100
OS_PH_SC: 20/25
OD_SC+: -1
OD_SC: J16

## 2022-05-20 ASSESSMENT — TONOMETRY
OD_IOP_MMHG: 12
OS_IOP_MMHG: 16
IOP_METHOD: ICARE

## 2022-05-20 ASSESSMENT — EXTERNAL EXAM - RIGHT EYE: OD_EXAM: NORMAL

## 2022-05-20 ASSESSMENT — EXTERNAL EXAM - LEFT EYE: OS_EXAM: NORMAL

## 2022-05-20 ASSESSMENT — SLIT LAMP EXAM - LIDS
COMMENTS: NORMAL
COMMENTS: NORMAL

## 2022-05-20 ASSESSMENT — CONF VISUAL FIELD
OD_NORMAL: 1
METHOD: COUNTING FINGERS
OS_NORMAL: 1

## 2022-05-20 NOTE — PROGRESS NOTES
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 She has experienced a significant weight gain in the last several year having gone from 220 lbs to 380 lbs.     These symptoms are intermittent and she has had a confirmed CSF leak after head trauma in 2019 with a possible recurrence more recently. Headaches have become nearly daily of late. She does report positional headaches though they are improved when laying down. She recently saw Dr. Encinas for evaluation of possible recurrent CSF leaks. Review of CT sinuses from 3/2022 demonstrates a possible skull base defect near the right cribriform plate.     She has had several MRIs since the onset of symptoms. Question partially empty sella but no other definitive radiologic evidence of ICP elevation.    Discussed with patient that there is a higher incidence of CSF leak in patients with IIH and that the most important intervention is weight loss.    -Given possible IIH in the past recommend MRV to be performed with upcoming MRI ordered by Dr. Encinas  -Advised that if patient were to undergo leak repair that she should follow up for repeat testing ~3 months afterwards  -follow-up appointment scheduled with Dr. Escobar on 9/20/22  -advised to call with any new or worsening vision changes or any other concerns    Hannah Rodriguez is a pleasant 56 year old White female who presents to my neuro-ophthalmology clinic today for evaluation of possible IIH.     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  She is hospitalized for 18 days during a previous CSF rhinorrhea episode.  During hospitalization she had 2 lumbar drains.  She is more recently evaluated by Mirza Khoury and Ce on 3/9/2022 requested neuro-ophthalmology evaluation for elevated ICP/papilledema given her history of recurrent CSF rhinorrhea and  "obesity.    Patient is very dizzy at times. She believes that at times her vision trails off to the side. Patient does endorse some graying out of both of her eyes, almost like she was going to faint. This occurs when she stands up or sits down, and almost all the time when leaning over. She is also having flashes that change her color vision for a short time. Patient struggles with daily headaches and uses tylenol regularly, which helps for an hour or two. She believes the daily headaches started approximately 7-8 years ago when she gained significant weight. Patient does endorse pulsatile tinnitus that worsens with lying down and is usually associated with a higher \"pressure\" headache.     She reports that her eye prescription has been changing frequency in the past few years. Patient does have blurry vision but can see at distance well. She does experience horizontal binocular double vision that resolves with closing one of her eyes.      Patient 380 pounds or so currently. She believes she weighed 210-220 around the time of onset of daily headaches.     Headaches - has every day for at least 7 years. She experiences light sensitivity, nausea without vomiting, vision changes like metal.     Comprehensive weight loss program.     Independent historians: Patient    Review of outside testing:   CT FACIAL BONES WITHOUT CONTRAST 3/9/2022 1:09 PM  Findings:   The paranasal sinuses are clear. The ostiomeatal units appear patent  bilaterally. The bony walls of the paranasal sinuses are intact. Normal retromaxillary and pterygopalatine fat. The adenoid tonsils in the nasopharynx are unremarkable.                                           Impression:   No evidence of sinusitis.       MRI brain wwo contrast 9/27/16    My interpretation performed today of outside testing:  Agree with above. Likely normal sellar morphology on recent CT.    Review of outside clinical notes:  Dr. Encinas 3/9/22  Plan:  1.  We reviewed " results of her imaging however we are concerned that she has new nasal drainage.  Given her history, she will require work-up to determine if this is consistent with recurrent CSF rhinorrhea.  2.  We will send a new beta-2 transferrin test from nasal drainage collection. She will work to collect this.  3.  I would like to repeat her MRI including T2 coronal thin sections (noncontrast MRI) and CT of the sinuses  4.  Referral to neuro-ophthalmology to rule out papilledema  5.  She will visit with her ENT skull base colleague Dr. Wander Khoury later this morning and he will perform an endoscopy  6.  She will receive the 2 pneumococcal vaccinations according to CDC guidelines for spontaneous CSF leak.  She will coordinate with her primary care physician to receive these.   7.  Dr. Khoury and/or myself will plan to see her back upon completion of her imaging and new beta-2 transferrin results.  5.  We reviewed warning signs for which she will contact us prior to her next appointment with us.  6.  I encouraged her to contact us should any questions or concerns arise in advance of her next appointment    4/2/22 ED visit with Dr. 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 Her vital signs are stable. Neurologically, she does not have a focal deficit. Her symptoms seemed to improve while she was here in the emergency department. Given her history of CSF leak will obtain a CT scan to evaluate for dilating ventricles.    Low suspicion for an ischemic stroke. Patient is anticoagulated on Xarelto.    Differential includes cellulitis, less likely sepsis, UTI, occult infection.    CT head negative for acute findings. Repeat neurologic exam with no focal deficits. Labs show a mildly elevated CRP,  normal metabolic panel and a mildly elevated white blood cell count at 12.1. Initial lactate was elevated at 5.3. No obvious source besides her left lower extremity. Urinalysis negative for infection. A lactate was repeated and comes back as normal at 2.0. Unclear if this was a spurious lab finding initially.    Given her comorbidities and the spreading redness of her ulceration will start her on a short course of Keflex for left lower leg cellulitis. I do not feel she has sepsis at this point. Her ataxia seems chronic and currently patient feels well. Further imaging deferred at this time and will have her follow up as scheduled with [facility] for her CSF leak. Previous notes reviewed and this is consistent with her previous notes.    Precautions discussed. Discharged in stable condition.    Past medical history:  atrial fibrillation, ADHD, hypertension, headaches, left knee pain, obesity, YOHANA, prediabetes, hysterectomy, colonoscopy    Family history / social history:  Reviewed and non-pertinent  Father with macular degeneration.    Exam:   Visual acuity without correction was 20/100 in the right eye and 20/60 in the left eye, pinhole corrects to 20/50 in right eye and 20/25 in left eye. IOP was 12 in the right eye and 16 in the left eye. Visual fields were full in both eyes. Ocular motility was full in all gaze directions. Color plates were 11/11 in the right eye and 11/11 in the left eye.  Pupils were equal, round and reactive to light with no RAPD.     Strabismus exam: wnl  Anterior segment exam: NS  Dilated fundus exam: no papilledema.    Tests ordered and interpreted today:  OCT rNFL demonstrated a mean NFL thickness of 95 in the right eye and 86 in the left eye. Thickness profile demonstrated normal thickness in all quadrants in the right eye and thinning in the temporal quadrant(s) in the left eye.  VF: Octopus 30 degree automated static perimetry visual field was performed. Test was reliable..  Right eye demonstrated deficits in the superior field(s). Left eye  was normal.  -9.3 MD right eye -2.3 left eye       50 minutes spent on the date of encounter doing chart review, history and exam, documentation, and further activities as noted above.       Wyatt Fink, MS4, [employer]     Benjamin Herbert DO   PGY-5 Neuro-Ophthalmology Fellow    Attending Physician Attestation:  Complete documentation of historical and exam elements from today's encounter can be found in the full encounter summary report (not reduplicated in this progress note).  I personally obtained the chief complaint(s) and history of present illness.  I confirmed and edited as necessary the review of systems, past medical/surgical history, family history, social history, and examination findings as documented by others; and I examined the patient myself.  I personally reviewed the relevant tests, images, and reports as documented above.  I formulated and edited as necessary the assessment and plan and discussed the findings and management plan with the patient and family. - Benjamin Herbert DO

## 2022-05-20 NOTE — NURSING NOTE
Chief Complaints and History of Present Illnesses   Patient presents with     Consult For     New Pt here for intracranial hypertension consult.     Chief Complaint(s) and History of Present Illness(es)     Consult For     Laterality: both eyes    Onset: gradual    Course: gradually worsening    Associated symptoms: double vision, tearing, photophobia, flashes and floaters.  Negative for dryness, eye pain and abnormal color vision    Treatments tried: no treatments    Pain scale: 0/10    Comments: New Pt here for intracranial hypertension consult.              Comments     Last exam was last spring.  Pt states vision has changed and fluctuates.  Tearing when Pt lays down.  Hx of flashes/floaters.  Occasional double vision in the past.  No ocular medications.    RIAZ Amos May 20, 2022 12:02 PM

## 2022-06-02 NOTE — TELEPHONE ENCOUNTER
Patient to complete MRI brain and sinus + MRV brain, per Dr. Encinas, Dr. Khoury, and Dr. Herbert. [facility] radiology calling to ask if providers want pt to have contrast MRI since she's having contrast with MRV. Discussed with Dr. Encinsa and MRI order modified. Pt has MRI knee today, is currently admitted after a fall. Writer discussed with [facility] radiology and with patient. She was scheduled for MRI brain today, but they do not have time for MRV. Will reschedule pt's MRI and MRV to another day.

## 2022-06-23 ENCOUNTER — TELEPHONE (OUTPATIENT)
Dept: NEUROSURGERY | Facility: CLINIC | Age: 57
End: 2022-06-23

## 2022-06-23 ENCOUNTER — CARE COORDINATION (OUTPATIENT)
Dept: NEUROSURGERY | Facility: CLINIC | Age: 57
End: 2022-06-23

## 2022-06-23 NOTE — PROGRESS NOTES
Writer returned call to [facility] to confirm patient imaging orders.     Libby Garrison LPN  Neurosurgery

## 2022-06-23 NOTE — TELEPHONE ENCOUNTER
[facility] Call Center    Phone Message    May a detailed message be left on voicemail: yes     Reason for Call: Order(s): Other:   Reason for requested: MRV  Date needed: ASAP  Provider name: Unknown    Pauline at [facility] called to advise that the order for the MRV Brain w/ Contrast need to be changed to MRV w/ and w/out contrast per [facility] protocols and for insurance purposes.    She states that this needs to be done today by 430pm as Pt's disability coverage ends today.    She asked for ALISON Samaniego..    Please make change to order and refax to [phone] ASAP.      Action Taken: Message routed to:  [facility] Neurosurgery    Travel Screening: Not Applicable

## 2022-06-23 NOTE — PROGRESS NOTES
Writer contacted [facility]. Spoke with Adrianna.  Re-faxed MRI/MRV orders.   Fax: [phone]  Phone: [phone]    [facility] will contact patient.     Writer left voice mail for patient to let her know the imaging orders re-faxed. Provided phone number to [facility] to schedule her imaging.       Libby Garrison LPN  Neurosurgery

## 2022-06-23 NOTE — TELEPHONE ENCOUNTER
Writer returned call to Pauline at [facility]. Order completed by [facility] for w/wo contrast. Patient disability ceases 06/24/2022. [facility] to reach out to [employer] to schedule.     Provided direct call back number.     Libby Garrison LPN  Neurosurgery

## 2022-08-09 ENCOUNTER — CARE COORDINATION (OUTPATIENT)
Dept: NEUROSURGERY | Facility: CLINIC | Age: 57
End: 2022-08-09

## 2022-08-09 NOTE — PROGRESS NOTES
Writer faxed request for patient imaging to be pushed to [employer] [facility].     CT Head 05/29/002, 08/05/2022  Requested MRI/MRB Brain images. Writer unable to find report in Care everywhere.     Writer left voice mail for patient to return call to confirm MRI/MRV was completed.     Libby Garrison LPN  Neurosurgery

## 2022-08-23 ENCOUNTER — TELEPHONE (OUTPATIENT)
Dept: NEUROSURGERY | Facility: CLINIC | Age: 57
End: 2022-08-23

## 2022-08-23 NOTE — TELEPHONE ENCOUNTER
[facility] Call Center    Phone Message    May a detailed message be left on voicemail: yes     Reason for Call: Other: Pt called back to advise she had her imaging and has gotten results.   Pt would like call back at [phone] to discuss the results and she has questions regarding PWM.      Action Taken: Message routed to:  [facility] Neurosurgery    Travel Screening: Not Applicable

## 2022-08-25 NOTE — TELEPHONE ENCOUNTER
Writer contacted [facility] Film room for imaging to be pushed to [facility].     Libby Garrison LPN  Neurosurgery

## 2022-09-06 ENCOUNTER — CARE COORDINATION (OUTPATIENT)
Dept: NEUROSURGERY | Facility: CLINIC | Age: 57
End: 2022-09-06

## 2022-09-06 ENCOUNTER — TELEPHONE (OUTPATIENT)
Dept: NEUROSURGERY | Facility: CLINIC | Age: 57
End: 2022-09-06

## 2022-09-06 NOTE — TELEPHONE ENCOUNTER
Called patient, informed her that we received MRI and MRV imaging from [facility]. Writer has sent to providers for their review, and will call pt with next steps.    Pt reports she still hasn't had any further drainage from her nose, so has been unable to collect fluid for beta 2 testing. Pt verbalized understanding of next steps.

## 2022-09-06 NOTE — PROGRESS NOTES
Writer had 08/228/2022 MRI Erastoentia pushed to [facility]/Resolved to MRN. Report in Care Everywhere.     Libby Garrison LPN  Neurosurgery

## 2022-09-08 NOTE — TELEPHONE ENCOUNTER
Reviewed provider recommendations with patient and informed her of imaging results. She verbalized understanding and will contact us if she develops CSF leak symptoms again. Encouraged her to call with changes and to collect fluid for beta 2 testing if she does begin experiencing drainage again. Pt appreciative of call.      Trevor Encinas MD Tyler, Matthew A, MD; Adela Reis, RN; Benjamin Herbert DO  I agree entirely - I do not see anything on the imaging and her lack of nasal drainage is reassuring. It seems to be intermittent.             Previous Messages       ----- Message -----   From: Yoni Khoury MD   Sent: 9/7/2022  10:37 PM CDT   To: Benjamin Herbert DO, Adela Reis, RN, *   Subject: RE: CSF leak follow up                           Aby Plascencia for following up with this patient.     If she's not having any nasal drainage c/f  CSF leak at this time, I think we can continue to monitor.     I did review the imaging and I'm not seeing anything of immediate concern (although Dr. Encinas might have a more discerning eye than me).     At this point, she may follow up with us/me as needed, but she should definitely reach out to us if she has more symptoms concerning for CSF leak.

## 2022-09-19 PROBLEM — F32.2 MAJOR DEPRESSIVE DISORDER, SEVERE (H): Status: ACTIVE | Noted: 2022-07-01

## 2022-09-19 PROBLEM — R51.9 HEADACHE: Status: ACTIVE | Noted: 2020-12-08

## 2022-09-19 PROBLEM — I89.0 LYMPHEDEMA OF BOTH LOWER EXTREMITIES: Status: ACTIVE | Noted: 2021-12-20

## 2022-09-19 PROBLEM — F42.9 OBSESSIVE COMPULSIVE DISORDER: Status: ACTIVE | Noted: 2022-08-16

## 2022-09-19 PROBLEM — E11.9 TYPE 2 DIABETES MELLITUS (H): Status: ACTIVE | Noted: 2022-05-31

## 2022-09-19 PROBLEM — I48.91 ATRIAL FIBRILLATION WITH RVR (H): Status: ACTIVE | Noted: 2020-12-09

## 2022-09-19 PROBLEM — R53.81 PHYSICAL DECONDITIONING: Status: ACTIVE | Noted: 2020-12-08

## 2022-09-19 PROBLEM — I87.2 CHRONIC VENOUS STASIS DERMATITIS OF BOTH LOWER EXTREMITIES: Status: ACTIVE | Noted: 2022-05-31

## 2022-09-19 PROBLEM — I10 HYPERTENSION, UNSPECIFIED TYPE: Status: ACTIVE | Noted: 2020-12-17

## 2022-09-20 DIAGNOSIS — H53.40 VISUAL FIELD DEFECT: Primary | ICD-10-CM

## 2022-10-03 ENCOUNTER — HEALTH MAINTENANCE LETTER (OUTPATIENT)
Age: 57
End: 2022-10-03

## 2023-02-12 ENCOUNTER — HEALTH MAINTENANCE LETTER (OUTPATIENT)
Age: 58
End: 2023-02-12

## 2023-05-21 ENCOUNTER — HEALTH MAINTENANCE LETTER (OUTPATIENT)
Age: 58
End: 2023-05-21

## 2023-10-22 ENCOUNTER — HEALTH MAINTENANCE LETTER (OUTPATIENT)
Age: 58
End: 2023-10-22

## 2024-03-10 ENCOUNTER — HEALTH MAINTENANCE LETTER (OUTPATIENT)
Age: 59
End: 2024-03-10

## 2024-07-28 ENCOUNTER — HEALTH MAINTENANCE LETTER (OUTPATIENT)
Age: 59
End: 2024-07-28

## 2024-10-05 ENCOUNTER — HEALTH MAINTENANCE LETTER (OUTPATIENT)
Age: 59
End: 2024-10-05

## 2024-12-14 ENCOUNTER — HEALTH MAINTENANCE LETTER (OUTPATIENT)
Age: 59
End: 2024-12-14

## 2025-03-16 ENCOUNTER — HEALTH MAINTENANCE LETTER (OUTPATIENT)
Age: 60
End: 2025-03-16